# Patient Record
Sex: FEMALE | Race: WHITE
[De-identification: names, ages, dates, MRNs, and addresses within clinical notes are randomized per-mention and may not be internally consistent; named-entity substitution may affect disease eponyms.]

---

## 2018-01-09 ENCOUNTER — HOSPITAL ENCOUNTER (OUTPATIENT)
Dept: HOSPITAL 17 - HRSP | Age: 53
End: 2018-01-09
Attending: SPECIALIST
Payer: COMMERCIAL

## 2018-01-09 DIAGNOSIS — J40: Primary | ICD-10-CM

## 2018-01-09 PROCEDURE — 94726 PLETHYSMOGRAPHY LUNG VOLUMES: CPT

## 2018-01-09 PROCEDURE — 36600 WITHDRAWAL OF ARTERIAL BLOOD: CPT

## 2018-01-09 PROCEDURE — 94729 DIFFUSING CAPACITY: CPT

## 2018-01-09 PROCEDURE — 82805 BLOOD GASES W/O2 SATURATION: CPT

## 2018-01-09 PROCEDURE — 94060 EVALUATION OF WHEEZING: CPT

## 2018-01-11 NOTE — RSPPFT
DATE OF PROCEDURE:   1/9/18



COMMENTS:   



Spirometry with FVC of 1.9 at 60% of predicted, FEV1 of 1.5 at 58%, FEV1/FVC ratio is 
normal. Flow is decreased at FEF 50, FEF 75 and FEF 25-75. Lung volumes show residual 
volume is decreased. TLC is decreased. Diffusion capacity is normal when corrected for 
lung volume. There is a good response after bronchodilator treatment. Post-bronchodilator 
study shows mild improvement. Room air arterial blood gases show pH of 7.42, PCO2 of 39, 
PO2 of 89, BiCarb of 25 and O2 Saturation at 95%.   



IMPRESSION:    

   

1,    Moderately severe obstructive disease.

2.    Additional restrictive disease is also present.

3.    Lung volumes are decreased.

4.    Good response after bronchodilator treatment.

5.    Diffusion capacity is normal when corrected for alveolar volume.

6.    Room air blood gases show normal oxygenation.